# Patient Record
Sex: FEMALE | Race: BLACK OR AFRICAN AMERICAN | NOT HISPANIC OR LATINO | URBAN - METROPOLITAN AREA
[De-identification: names, ages, dates, MRNs, and addresses within clinical notes are randomized per-mention and may not be internally consistent; named-entity substitution may affect disease eponyms.]

---

## 2023-05-19 ENCOUNTER — EMERGENCY (EMERGENCY)
Facility: HOSPITAL | Age: 63
LOS: 1 days | Discharge: HOME | End: 2023-05-19
Attending: STUDENT IN AN ORGANIZED HEALTH CARE EDUCATION/TRAINING PROGRAM
Payer: COMMERCIAL

## 2023-05-19 VITALS
WEIGHT: 164.91 LBS | TEMPERATURE: 98 F | HEIGHT: 68 IN | SYSTOLIC BLOOD PRESSURE: 147 MMHG | OXYGEN SATURATION: 99 % | DIASTOLIC BLOOD PRESSURE: 90 MMHG | HEART RATE: 74 BPM | RESPIRATION RATE: 18 BRPM
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
## 2023-05-19 DIAGNOSIS — R51.9 HEADACHE, UNSPECIFIED: ICD-10-CM
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
## 2023-05-19 DIAGNOSIS — M54.2 CERVICALGIA: ICD-10-CM

## 2023-05-19 DIAGNOSIS — S16.1XXA STRAIN OF MUSCLE, FASCIA AND TENDON AT NECK LEVEL, INITIAL ENCOUNTER: ICD-10-CM
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
## 2023-05-19 PROCEDURE — 70450 CT HEAD/BRAIN W/O DYE: CPT | Mod: 26,MA

## 2023-05-19 PROCEDURE — 72125 CT NECK SPINE W/O DYE: CPT | Mod: 26,MA

## 2023-05-19 PROCEDURE — 99284 EMERGENCY DEPT VISIT MOD MDM: CPT

## 2023-05-19 RX ORDER — ACETAMINOPHEN 500 MG
975 TABLET ORAL ONCE
Refills: 0 | Status: COMPLETED | OUTPATIENT
Start: 2023-05-19 | End: 2023-05-19

## 2023-05-19 NOTE — ED PROVIDER NOTE - CLINICAL SUMMARY MEDICAL DECISION MAKING FREE TEXT BOX
pt presented today with headache and neck pain after being struck with a ball to her face, no loc, no vomiting or visual changes, will obtain ct, medicate for her pain, reassess and dispo

## 2023-05-19 NOTE — ED ADULT TRIAGE NOTE - CHIEF COMPLAINT QUOTE
No PMH  C/o got hit on right side of the face by basketball/ soccer ball ~12pm today. Denies LOC, fall. Now complaining of headache , neck pain r/t lower back.

## 2023-05-19 NOTE — ED PROVIDER NOTE - CPE EDP CARDIAC NORM
Spoke with [facility], says she is still in [facility] and they wouldn't go there. [facility] Says he will then process order. No further questions/concerns.   normal...

## 2023-05-19 NOTE — ED ADULT NURSE NOTE - CHPI ED NUR SYMPTOMS NEG
no blurred vision/no change in level of consciousness/no confusion/no dizziness/no loss of consciousness/no seizure/no syncope/no vomiting

## 2023-05-19 NOTE — ED PROVIDER NOTE - PATIENT PORTAL LINK FT
You can access the FollowMyHealth Patient Portal offered by [facility] by registering at the following website: [email or web address]. By joining [employer]’s FollowMyHealth portal, you will also be able to view your health information using other applications (apps) compatible with our system.

## 2023-05-19 NOTE — ED ADULT NURSE NOTE - NSFALLRISKINTERV_ED_ALL_ED

## 2023-05-19 NOTE — ED ADULT NURSE NOTE - OBJECTIVE STATEMENT
patient is a&ox4 complaining of head injury around 12pm today. 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 Patient states she did not fall and denies LOC. Patient had dull R sided jaw pain, radiating to left side of neck and down to left lower back. patient states pain is 6/10. No difficulty bearing weight, no current n/v, blurry vision, double vision, dizziness. no pmh given

## 2023-05-19 NOTE — ED PROVIDER NOTE - OBJECTIVE STATEMENT
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

## 2023-05-20 VITALS
HEART RATE: 69 BPM | SYSTOLIC BLOOD PRESSURE: 121 MMHG | DIASTOLIC BLOOD PRESSURE: 81 MMHG | TEMPERATURE: 98 F | OXYGEN SATURATION: 100 % | RESPIRATION RATE: 17 BRPM

## 2025-06-19 ENCOUNTER — EMERGENCY (EMERGENCY)
Facility: HOSPITAL | Age: 65
LOS: 0 days | Discharge: HOME | End: 2025-06-19
Attending: EMERGENCY MEDICINE
Payer: COMMERCIAL

## 2025-06-19 VITALS
RESPIRATION RATE: 18 BRPM | OXYGEN SATURATION: 99 % | TEMPERATURE: 99 F | HEART RATE: 80 BPM | SYSTOLIC BLOOD PRESSURE: 155 MMHG | DIASTOLIC BLOOD PRESSURE: 81 MMHG

## 2025-06-19 VITALS — HEIGHT: 68 IN | OXYGEN SATURATION: 96 % | WEIGHT: 149.91 LBS | RESPIRATION RATE: 18 BRPM | HEART RATE: 80 BPM

## 2025-06-19 DIAGNOSIS — J30.2 OTHER SEASONAL ALLERGIC RHINITIS: ICD-10-CM

## 2025-06-19 DIAGNOSIS — Z87.891 PERSONAL HISTORY OF NICOTINE DEPENDENCE: ICD-10-CM

## 2025-06-19 DIAGNOSIS — J06.9 ACUTE UPPER RESPIRATORY INFECTION, UNSPECIFIED: ICD-10-CM

## 2025-06-19 DIAGNOSIS — R05.1 ACUTE COUGH: ICD-10-CM

## 2025-06-19 DIAGNOSIS — R09.81 NASAL CONGESTION: ICD-10-CM

## 2025-06-19 LAB
FLUAV AG NPH QL: SIGNIFICANT CHANGE UP
FLUBV AG NPH QL: SIGNIFICANT CHANGE UP
RSV RNA NPH QL NAA+NON-PROBE: SIGNIFICANT CHANGE UP
SARS-COV-2 RNA SPEC QL NAA+PROBE: SIGNIFICANT CHANGE UP
SOURCE RESPIRATORY: SIGNIFICANT CHANGE UP

## 2025-06-19 PROCEDURE — 71046 X-RAY EXAM CHEST 2 VIEWS: CPT | Mod: 26

## 2025-06-19 PROCEDURE — 99284 EMERGENCY DEPT VISIT MOD MDM: CPT

## 2025-06-19 RX ORDER — ACETAMINOPHEN 500 MG/5ML
975 LIQUID (ML) ORAL ONCE
Refills: 0 | Status: COMPLETED | OUTPATIENT
Start: 2025-06-19 | End: 2025-06-19

## 2025-06-19 RX ORDER — FLUTICASONE PROPIONATE 50 UG/1
1 SPRAY, METERED NASAL
Refills: 0 | Status: DISCONTINUED | OUTPATIENT
Start: 2025-06-19 | End: 2025-06-19

## 2025-06-19 RX ADMIN — Medication 975 MILLIGRAM(S): at 12:04

## 2025-06-19 RX ADMIN — FLUTICASONE PROPIONATE 1 SPRAY(S): 50 SPRAY, METERED NASAL at 12:16

## 2025-06-19 NOTE — ED PROVIDER NOTE - NSFOLLOWUPINSTRUCTIONS_ED_ALL_ED_FT
- You were seen in the Emergency Department Today for cough and congestion  - Your x-ray was negative for any infection, your viral swab is still pending-you receive a phone call if positive  - Take Tylenol up to 1,000mg every 6 hours as needed for pain or fever- do not take more than 4g in 24hrs, take Motrin 600 mg every 8 hours as needed for moderate pain or fever-- take with food.  - Please follow up with your primary care doctor as discussed  - Return to the Emergency Department IMMEDIATELY if you experience any new or concerning symptoms including difficulty breathing, chest pain, palpitations, fevers, productive cough, lightheadedness or dizziness, you pass out. - You were seen in the Emergency Department Today for cough and congestion  - Your x-ray was negative for any infection, your viral swab is still pending-you receive a phone call if positive  - Take Tylenol up to 1,000mg every 6 hours as needed for pain or fever- do not take more than 4g in 24hrs, take Motrin 600 mg every 8 hours as needed for moderate pain or fever-- take with food. May use the Flonase 1 spray in both nostrils 2 times a day  - Please follow up with your primary care doctor as discussed  - Return to the Emergency Department IMMEDIATELY if you experience any new or concerning symptoms including difficulty breathing, chest pain, palpitations, fevers, productive cough, lightheadedness or dizziness, you pass out.

## 2025-06-19 NOTE — ED PROVIDER NOTE - PATIENT PORTAL LINK FT
You can access the FollowMyHealth Patient Portal offered by [facility] by registering at the following website: [email or web address]. By joining [facility]’s FollowMyHealth portal, you will also be able to view your health information using other applications (apps) compatible with our system.

## 2025-06-19 NOTE — ED PROVIDER NOTE - NS ED ROS FT
Review of Systems:    -  Respiratory: Positive for cough with clear mucus production, nasal congestion    -  Constitutional: Positive for fatigue    -  ENT: Positive for nasal congestion, sinus pressure and post-nasal drip    -  Cardiovascular: Negative for chest pain or palpitations    -  Gastrointestinal: Negative for nausea or vomiting    -  Musculoskeletal: Negative for muscle aches or joint pain

## 2025-06-19 NOTE — ED PROVIDER NOTE - CLINICAL SUMMARY MEDICAL DECISION MAKING FREE TEXT BOX
Attending note (Stephon): Differential Diagnosis:    - Allergic rhinitis, Viral upper respiratory infection, Pneumonia     Assessment and Plan: Upper Respiratory Symptoms    - Assessment/Plan: The patient presents with a 3-week history of upper respiratory symptoms, initially thought to be a cold and later suspected to be allergies. Given the duration of symptoms and recent travel, I want to evaluate for pneumonia, although the lung exam is clear and the patient is afebrile and well appearing. The symptoms are most consistent with allergic rhinitis, possibly exacerbated by recent air travel and environmental changes.      - Chest X-ray to evaluate for pneumonia       - Administer Tylenol for symptomatic relief       - Recommend continuing antihistamines       - Prescribe Flonase (fluticasone) nasal spray to address nasal congestion and post-nasal drip       - Educate patient on proper use of nasal spray       - Advise patient to follow up with primary care provider if symptoms persist or worsen       - Discharge home if chest X-ray is negative for pneumonia

## 2025-06-19 NOTE — ED PROVIDER NOTE - ATTENDING APP SHARED VISIT CONTRIBUTION OF CARE
Attending note (Stephon): Attending note (Stephon): Patient seen and evaluated initially by myself, with the my care plan instituted by the advanced care practitioner as detailed above in the medical decision making section. See MDM or progress notes sections for updates to this care plan.  I have reviewed and agree with any additional documentation by ADALBERTO Corley.  Chest x-ray reviewed no evidence of edema effusion consolidation or pneumothorax.  Patient well-appearing not hypoxic.  Possible allergic versus postviral symptoms.  No gross evidence of volume overload or heart failure.  Symptoms not seem consistent with pulmonary embolism there is no evidence on clinical exam to suggest DVT.  No murmurs appreciated on auscultation to suggest valvular heart disease.  Agree with above plan for discharge and further evaluation as outpatient.

## 2025-06-19 NOTE — ED PROVIDER NOTE - OBJECTIVE STATEMENT
Attending note (Stephon):     - Chief Complaint (CC): Persistent cold-like symptoms for 3 weeks with nasal congestion, cough, and clear mucus production    - History of Present Illness: The patient is a 65-year-old individual who presents to the emergency department with a 3-week history of upper respiratory symptoms. The onset of symptoms began on June 4th after disembarking from a plane in [place]. Initially, the patient believed it to be a common cold and started taking over-the-counter cold medicine. However, the symptoms persisted and evolved, leading the patient to suspect allergies. The patient reports experiencing significant fatigue, describing an inability to 'keep my head up.' The main symptoms include nasal congestion, cough with clear mucus production, and general malaise. The patient denies fever, though admits to not checking temperature with a thermometer. The cough is described as intermittent throughout the day, productive of clear mucus, without thick or milky sputum. The patient's brother suggested the possibility of pneumonia, which prompted the emergency department visit. The patient has not taken any medication for symptom relief today.    - History: The patient has a history of asthma, diagnosed in their early 30s. The patient is a former smoker but has successfully quit. No other significant medical history was reported.

## 2025-06-19 NOTE — ED PROVIDER NOTE - PROGRESS NOTE DETAILS
ANDRE Corley NP: 65-year-old female no past medical history except for seasonal allergies (no history of diagnosed asthma) presenting to ED complaining of several weeks of congestion and difficulty taking deep breaths.  Patient denies any shortness of breath or difficulty breathing.  Patient states she feels as if she is taking shallow breaths until she either coughs or blows her nose and then breathing returns to normal.  Patient endorses intermittent coughing fits in which she expels clear mucus, no fevers or chills, chest pain, palpitations, lightheadedness or dizziness.  On exam patient is well-appearing in no acute distress.  Vital signs stable.  Lung sounds equal and clear bilaterally.  Viral swab pending.  Chest x-ray negative for pneumonia.  Will DC patient with outpatient PMD follow-up.  Patient is agreeable to plan, questions answered understanding verbalized, return precautions given.

## 2025-06-19 NOTE — ED ADULT NURSE NOTE - OBJECTIVE STATEMENT
Covering for Primary RN. 65 yr old female AOx4. C/o cold symptoms x2 weeks. Reports onset of symptoms after traveling from [place] 6/4/25. Endorses nasal congestion, cough with white sputum, and mild CABRERA. Denies CP/SOB. Lungs clear B/L. PMH fo asthma.

## 2025-06-19 NOTE — ED PROVIDER NOTE - PHYSICAL EXAMINATION
On Physical Exam:  General: well appearing, in NAD, speaking clearly in full sentences and without difficulty; cooperative with exam  HEENT: PERRL, MMM b/l nasal turbinate inflammation, clear rhinorrhea, posteri pharyngeal mucus tracks no tonsillar edema/exudates/vesicles, uvula midline  Neck: no neck tenderness, no nuchal rigidity, no LNA  Cardiac: normal s1, s2; RRR; no MGR  Lungs: CTABL  Abdomen: soft nontender/nondistended  Skin: intact, no rash  Extremities: no peripheral edema, no gross deformities